# Patient Record
Sex: MALE | URBAN - METROPOLITAN AREA
[De-identification: names, ages, dates, MRNs, and addresses within clinical notes are randomized per-mention and may not be internally consistent; named-entity substitution may affect disease eponyms.]

---

## 2021-04-08 DIAGNOSIS — Z23 ENCOUNTER FOR IMMUNIZATION: ICD-10-CM

## 2024-02-20 ENCOUNTER — TELEPHONE (OUTPATIENT)
Dept: PSYCHIATRY | Facility: CLINIC | Age: 44
End: 2024-02-20

## 2024-02-20 NOTE — TELEPHONE ENCOUNTER
Patient LVM asking about an appointment tomorrow.    Writer LVM with office number for further assistance.

## 2024-08-17 ENCOUNTER — TELEPHONE (OUTPATIENT)
Dept: OTHER | Facility: OTHER | Age: 44
End: 2024-08-17

## 2024-08-19 PROBLEM — R68.82 DECREASED LIBIDO: Status: ACTIVE | Noted: 2024-08-19

## 2024-08-19 PROBLEM — M41.9 MULTILEVEL SCOLIOSIS: Status: ACTIVE | Noted: 2024-08-19

## 2024-08-19 PROBLEM — E55.9 VITAMIN D INSUFFICIENCY: Status: ACTIVE | Noted: 2024-08-19

## 2024-08-19 PROBLEM — F98.8 ATTENTION DEFICIT DISORDER: Status: ACTIVE | Noted: 2024-08-19

## 2024-08-19 PROBLEM — Z00.00 HEALTHCARE MAINTENANCE: Status: ACTIVE | Noted: 2024-08-19

## 2024-09-18 PROBLEM — Z00.00 HEALTHCARE MAINTENANCE: Status: RESOLVED | Noted: 2024-08-19 | Resolved: 2024-09-18

## 2024-10-05 LAB
ALBUMIN SERPL-MCNC: 4.3 G/DL (ref 3.5–5.7)
ALP SERPL-CCNC: 47 U/L (ref 35–120)
ALT SERPL-CCNC: 25 U/L
ANION GAP SERPL CALCULATED.3IONS-SCNC: 5 MMOL/L (ref 3–11)
AST SERPL-CCNC: 18 U/L
BASOPHILS # BLD AUTO: 0.1 THOU/CMM (ref 0–0.1)
BASOPHILS NFR BLD AUTO: 1 %
BILIRUB SERPL-MCNC: 0.4 MG/DL (ref 0.2–1)
BUN SERPL-MCNC: 12 MG/DL (ref 7–28)
CALCIUM SERPL-MCNC: 9.6 MG/DL (ref 8.5–10.1)
CHLORIDE SERPL-SCNC: 104 MMOL/L (ref 100–109)
CHOLEST SERPL-MCNC: 188 MG/DL
CHOLEST/HDLC SERPL: 2.5 {RATIO}
CO2 SERPL-SCNC: 32 MMOL/L (ref 21–31)
CREAT SERPL-MCNC: 0.97 MG/DL (ref 0.53–1.3)
CYTOLOGY CMNT CVX/VAG CYTO-IMP: ABNORMAL
DIFFERENTIAL METHOD BLD: NORMAL
EOSINOPHIL # BLD AUTO: 0.1 THOU/CMM (ref 0–0.5)
EOSINOPHIL NFR BLD AUTO: 2 %
ERYTHROCYTE [DISTWIDTH] IN BLOOD BY AUTOMATED COUNT: 13 % (ref 12–16)
GFR/BSA.PRED SERPLBLD CYS-BASED-ARV: 99 ML/MIN/{1.73_M2}
GLUCOSE SERPL-MCNC: 91 MG/DL (ref 65–99)
HCT VFR BLD AUTO: 39.6 % (ref 37–48)
HDLC SERPL-MCNC: 74 MG/DL (ref 23–92)
HGB BLD-MCNC: 13.5 G/DL (ref 12.5–17)
LDLC SERPL CALC-MCNC: 101 MG/DL
LYMPHOCYTES # BLD AUTO: 2.9 THOU/CMM (ref 1–3)
LYMPHOCYTES NFR BLD AUTO: 53 %
MCH RBC QN AUTO: 30.1 PG (ref 27–36)
MCHC RBC AUTO-ENTMCNC: 34.1 G/DL (ref 32–37)
MCV RBC AUTO: 88 FL (ref 80–100)
MONOCYTES # BLD AUTO: 0.4 THOU/CMM (ref 0.3–1)
MONOCYTES NFR BLD AUTO: 7 %
NEUTROPHILS # BLD AUTO: 2 THOU/CMM (ref 1.8–7.8)
NEUTROPHILS NFR BLD AUTO: 37 %
NONHDLC SERPL-MCNC: 114 MG/DL
PLATELET # BLD AUTO: 226 THOU/CMM (ref 140–350)
PMV BLD REES-ECKER: 8.5 FL (ref 7.5–11.3)
POTASSIUM SERPL-SCNC: 4.5 MMOL/L (ref 3.5–5.2)
PROT SERPL-MCNC: 6.8 G/DL (ref 6.3–8.3)
RBC # BLD AUTO: 4.49 MILL/CMM (ref 4–5.4)
SODIUM SERPL-SCNC: 141 MMOL/L (ref 135–145)
TRIGL SERPL-MCNC: 64 MG/DL
TSH SERPL-ACNC: 2.45 UIU/ML (ref 0.45–5.33)
WBC # BLD AUTO: 5.4 THOU/CMM (ref 4–10.5)

## 2024-12-19 LAB — 25(OH)D3+25(OH)D2 SERPL-MCNC: 29 NG/ML (ref 30–100)

## 2024-12-24 LAB
TESTOST FREE SERPL-MCNC: 4.14 PG/ML
TESTOST SERPL-MCNC: 252 NG/DL

## 2024-12-28 ENCOUNTER — RESULTS FOLLOW-UP (OUTPATIENT)
Dept: FAMILY MEDICINE CLINIC | Facility: CLINIC | Age: 44
End: 2024-12-28

## 2024-12-28 DIAGNOSIS — R79.89 LOW TESTOSTERONE LEVEL IN MALE: Primary | ICD-10-CM

## 2025-03-18 ENCOUNTER — TELEPHONE (OUTPATIENT)
Age: 45
End: 2025-03-18

## 2025-03-18 NOTE — TELEPHONE ENCOUNTER
New Patient      Insurance   Current Insurance? [employer]    Insurance E-verified? Showing connectivity error    History   Reason for appointment/active symptoms?  Referral for low testosterone     Has the patient had any previous Urologist(s)? no     Was the patient seen in the ED? no     Labs/Imaging(Including Out Of Network)? Labs 12/2024     Records Requested? yes  Records Visible in EPIC? yes      Personal history of cancer? no     Appointment   Office location preference:  [place]  ?   Appointment Details   Date:  4/4/25  Time:  2pm  Location:  [place]  Provider:  Yoli Daugherty  Does the appointment need further review?

## 2025-04-03 RX ORDER — DIPHENOXYLATE HYDROCHLORIDE AND ATROPINE SULFATE 2.5; .025 MG/1; MG/1
1 TABLET ORAL DAILY
COMMUNITY

## 2025-04-03 RX ORDER — BUPROPION HYDROCHLORIDE 300 MG/1
300 TABLET ORAL
COMMUNITY
Start: 2025-03-16

## 2025-04-04 ENCOUNTER — CONSULT (OUTPATIENT)
Dept: UROLOGY | Facility: AMBULATORY SURGERY CENTER | Age: 45
End: 2025-04-04

## 2025-04-04 VITALS
BODY MASS INDEX: 23.95 KG/M2 | HEART RATE: 95 BPM | DIASTOLIC BLOOD PRESSURE: 76 MMHG | WEIGHT: 158 LBS | OXYGEN SATURATION: 97 % | HEIGHT: 68 IN | SYSTOLIC BLOOD PRESSURE: 118 MMHG

## 2025-04-04 DIAGNOSIS — R79.89 LOW TESTOSTERONE LEVEL IN MALE: ICD-10-CM

## 2025-04-04 NOTE — PROGRESS NOTES
Name: Zeus Oliva IV      : 1980      MRN: [id number]  Encounter Provider: RAMY Perez  Encounter Date: 2025   Encounter department: [facility] [place]  :  Assessment & Plan  Low testosterone level in male  Last level completed 24 and returned with total testosterone of 252. Discussed treatment for low testosterone should be initiated only after two low readings 2 weeks apart tested in the morning.   Pt has been on methadone for many years which we discussed can also decrease testosterone.   He would like to complete further testing to assess for low reading and undergo treatment.   We did discuss the options for TRT including modalities of treatment such as gel, injections, or testopel performed in the office by Dr. Madera and side effects.   Will repeat labs including testosterone, CBC, FSH and LH. Pt to also complete TSH and Lipids recently ordered by his PCP.   We will have a follow up to discuss labs after he completes them.      Orders:    Ambulatory Referral to Urology    Testosterone, free, total; Future    CBC and differential; Future    Prolactin; Future    FSH and LH; Future        History of Present Illness   Zeus Oliva IV is a 44 y.o. male who presents as a new patient for low testosterone. Pt states over the last couple of months he has noticed a lack of motivation, fatigue, as well as a decreased libido. He states that he recently was seen my his psychiatrist who started him on wellbutrin and that has been helping him slightly with these symptoms. He notes that his  is currently on TRT for low T and he had similar symptoms when he ws diagnosed with low testosterone. He was seen by his PCP who ordered initial level of testosterone and it returned at 252 2024.    Review of Systems   Constitutional:  Positive for activity change and fatigue. Negative for chills and fever.   Gastrointestinal:  Negative for abdominal pain.   Genitourinary:  " Negative for difficulty urinating, dysuria, flank pain, frequency, hematuria and urgency.   Psychiatric/Behavioral:  Positive for dysphoric mood.           Objective   /76 (BP Location: Left arm, Patient Position: Sitting, Cuff Size: Standard)   Pulse 95   Ht 5' 8\" (1.727 m)   Wt 71.7 kg (158 lb)   SpO2 97%   BMI 24.02 kg/m²     Physical Exam  Vitals reviewed.   Constitutional:       General: He is not in acute distress.     Appearance: Normal appearance. He is normal weight. He is not toxic-appearing.   HENT:      Head: Normocephalic and atraumatic.   Eyes:      Conjunctiva/sclera: Conjunctivae normal.   Cardiovascular:      Rate and Rhythm: Normal rate.   Pulmonary:      Effort: Pulmonary effort is normal.   Abdominal:      Palpations: Abdomen is soft.   Musculoskeletal:         General: Normal range of motion.   Skin:     General: Skin is warm and dry.   Neurological:      Mental Status: He is alert and oriented to person, place, and time.   Psychiatric:         Mood and Affect: Mood normal.         Behavior: Behavior normal.           Results   No results found for: \"PSA\"  Lab Results   Component Value Date    CALCIUM 9.6 10/02/2024    K 4.5 10/02/2024    CO2 32 (H) 10/02/2024     10/02/2024    BUN 12 10/02/2024    CREATININE 0.97 10/02/2024     Lab Results   Component Value Date    WBC 5.4 10/02/2024    HGB 13.5 10/02/2024    HCT 39.6 10/02/2024    MCV 88 10/02/2024     10/02/2024       Office Urine Dip  No results found for this or any previous visit (from the past hour).      "

## 2025-04-21 ENCOUNTER — PATIENT MESSAGE (OUTPATIENT)
Dept: UROLOGY | Facility: AMBULATORY SURGERY CENTER | Age: 45
End: 2025-04-21

## 2025-04-22 NOTE — PATIENT COMMUNICATION
Please assist and get labs from [facility] for provider to review.     Please sent provider results , once received.

## 2025-04-24 ENCOUNTER — TELEPHONE (OUTPATIENT)
Dept: UROLOGY | Facility: AMBULATORY SURGERY CENTER | Age: 45
End: 2025-04-24

## 2025-04-24 NOTE — TELEPHONE ENCOUNTER
Called and spoke to pt and gave fax number that he should give to [facility] to have them get us the results.

## 2025-04-29 ENCOUNTER — OFFICE VISIT (OUTPATIENT)
Dept: FAMILY MEDICINE CLINIC | Facility: CLINIC | Age: 45
End: 2025-04-29
Payer: COMMERCIAL

## 2025-04-29 VITALS
RESPIRATION RATE: 16 BRPM | WEIGHT: 160.2 LBS | TEMPERATURE: 97.5 F | OXYGEN SATURATION: 98 % | DIASTOLIC BLOOD PRESSURE: 70 MMHG | SYSTOLIC BLOOD PRESSURE: 118 MMHG | HEIGHT: 68 IN | BODY MASS INDEX: 24.28 KG/M2 | HEART RATE: 94 BPM

## 2025-04-29 DIAGNOSIS — N52.8 OTHER MALE ERECTILE DYSFUNCTION: Primary | ICD-10-CM

## 2025-04-29 PROCEDURE — 99213 OFFICE O/P EST LOW 20 MIN: CPT | Performed by: FAMILY MEDICINE

## 2025-04-29 RX ORDER — SILDENAFIL 100 MG/1
100 TABLET, FILM COATED ORAL DAILY PRN
Qty: 30 TABLET | Refills: 0 | Status: SHIPPED | OUTPATIENT
Start: 2025-04-29

## 2025-04-29 NOTE — PROGRESS NOTES
"Name: Zeus Oliva IV      : 1980      MRN: [id number]  Encounter Provider: Mor Recinos MD  Encounter Date: 2025   Encounter department: [facility]  :  Assessment & Plan  Other male erectile dysfunction  - Advised pt to keep f/u with urology for treatment of low testosterone.   - Prescribed Viagra 100 mg tab PO prn   - Discussed most effective manner of taking Viagra, including 1-2 hours before sexual intercourse and on an empty stomach.  - Plan 6 month wellness visit with updated labs.    Orders:  •  sildenafil (Viagra) 100 mg tablet; Take 1 tablet (100 mg total) by mouth daily as needed for erectile dysfunction           History of Present Illness   Zeus Oliva IV is a 45 y/o male with a PMH of decreased libido and multilevel sclerosis presenting for erectile dysfunction x 1 month. Pt has tried a sample of tadalafil (Cialis) in the past. He states that he takes methadone (Methadose), Wellbutrin XR. Blood pressure in the office today was 118/70. Recent labs from urology reveal decreased testosterone. Pt plans to f/u with urology for treatment of low T. No additional complaints.       Erectile Dysfunction  Irritative symptoms do not include frequency or urgency. Pertinent negatives include no chills, dysuria or hematuria.   Review of Systems   Constitutional:  Negative for chills and fever.   Respiratory:  Negative for shortness of breath.    Cardiovascular:  Negative for chest pain and palpitations.   Genitourinary:  Negative for dysuria, frequency, hematuria, penile pain and urgency.       Objective   /70 (BP Location: Left arm, Patient Position: Sitting, Cuff Size: Standard)   Pulse 94   Temp 97.5 °F (36.4 °C) (Tympanic)   Resp 16   Ht 5' 8\" (1.727 m)   Wt 72.7 kg (160 lb 3.2 oz)   SpO2 98%   BMI 24.36 kg/m²      Physical Exam  Vitals reviewed.   Constitutional:       General: He is not in acute distress.     Appearance: He is well-developed and " normal weight.   HENT:      Head: Normocephalic and atraumatic.   Eyes:      Extraocular Movements: Extraocular movements intact.      Conjunctiva/sclera: Conjunctivae normal.   Cardiovascular:      Rate and Rhythm: Normal rate and regular rhythm.      Heart sounds: No murmur heard.  Pulmonary:      Effort: Pulmonary effort is normal. No respiratory distress.      Breath sounds: Normal breath sounds.   Musculoskeletal:         General: No swelling.      Cervical back: Neck supple.   Skin:     General: Skin is warm and dry.   Neurological:      Mental Status: He is alert.   Psychiatric:         Mood and Affect: Mood normal.

## 2025-04-29 NOTE — ASSESSMENT & PLAN NOTE
- Advised pt to keep f/u with urology for treatment of low testosterone.   - Prescribed Viagra 100 mg tab PO prn   - Discussed most effective manner of taking Viagra, including 1-2 hours before sexual intercourse and on an empty stomach.  - Plan 6 month wellness visit with updated labs.    Orders:  •  sildenafil (Viagra) 100 mg tablet; Take 1 tablet (100 mg total) by mouth daily as needed for erectile dysfunction

## 2025-05-29 ENCOUNTER — PATIENT MESSAGE (OUTPATIENT)
Dept: UROLOGY | Facility: AMBULATORY SURGERY CENTER | Age: 45
End: 2025-05-29

## 2025-06-13 ENCOUNTER — TELEPHONE (OUTPATIENT)
Dept: NEUROLOGY | Facility: CLINIC | Age: 45
End: 2025-06-13

## 2025-06-13 NOTE — TELEPHONE ENCOUNTER
LMOM asking patient to callback to reschedule his appt on 7/24/25 appt at 12:30pm at the [place] [facility] with Dr Mott due to scheduling issue (scheduled on blocked time) to 7/23/25 at 12:30pm. If patient accepting please reschedule, if that day does not work for him please reschedule him at next available appt slot.

## 2025-07-16 ENCOUNTER — TELEPHONE (OUTPATIENT)
Dept: NEUROLOGY | Facility: CLINIC | Age: 45
End: 2025-07-16

## 2025-07-16 NOTE — TELEPHONE ENCOUNTER
Mailed Appt reminder card to home address on file for upcoming Neurology appt with Dr Mott at the [place] office as well as directions to the office. I also asked he please bring any imaging disk(s) and records if he has any available.

## 2025-07-17 ENCOUNTER — TELEPHONE (OUTPATIENT)
Dept: UROLOGY | Facility: AMBULATORY SURGERY CENTER | Age: 45
End: 2025-07-17

## 2025-07-17 ENCOUNTER — PATIENT MESSAGE (OUTPATIENT)
Dept: UROLOGY | Facility: AMBULATORY SURGERY CENTER | Age: 45
End: 2025-07-17

## 2025-07-17 NOTE — TELEPHONE ENCOUNTER
"I called [facility] labs ([phone], spoke to \"Katlyn\") regarding labs that were ordered on this patient by RAMY Kent on 4/4/25, but were \"missing\" the FSH, LH and PROLACTIN results. Katlyn informed me that the patient brought a paper copy of labs with him that only included the page with the CBC with diff and Testerone.   "

## 2025-07-18 LAB
FSH SERPL-ACNC: 1.64 MIU/ML (ref 1.27–19.26)
LH SERPL-ACNC: 2.51 MIU/ML (ref 1.24–8.62)
PROLACTIN SERPL-MCNC: 11.15 NG/ML (ref 2.64–13.13)

## 2025-07-18 NOTE — TELEPHONE ENCOUNTER
Patient called stating his appointment needed to be rescheduled due to not having all the labs done.  He was not aware of other labs . He thought he did them all.  Informed patient he was missing   FSH, LH and PROLACTIN . He stated he will do them today.  Next available appointment with Yoli is September.  He stated he is not waiting 2 more months for the appointment.  Patient is scheduled with Jb for 07/29/25 at 240 pm.

## 2025-07-21 NOTE — TELEPHONE ENCOUNTER
Sent shalonda msg with directions and appt reminder.     LMOM confirming and reminding him of his appt on 7/28/25  at 8:00am with Dr Mott. I asked him to come 15mins prior to appt to allow us time to update his chart as needed. I also asked if he is unable to make the appt and needs to reschedule or cancel to please call the office.

## 2025-07-28 ENCOUNTER — CONSULT (OUTPATIENT)
Dept: NEUROLOGY | Facility: CLINIC | Age: 45
End: 2025-07-28
Attending: FAMILY MEDICINE
Payer: COMMERCIAL

## 2025-07-28 VITALS
WEIGHT: 164.2 LBS | BODY MASS INDEX: 24.89 KG/M2 | HEIGHT: 68 IN | TEMPERATURE: 97.1 F | DIASTOLIC BLOOD PRESSURE: 80 MMHG | RESPIRATION RATE: 16 BRPM | SYSTOLIC BLOOD PRESSURE: 124 MMHG | HEART RATE: 123 BPM | OXYGEN SATURATION: 97 %

## 2025-07-28 DIAGNOSIS — G35 MULTIPLE SCLEROSIS (HCC): Primary | ICD-10-CM

## 2025-07-28 DIAGNOSIS — R68.89 FORGETFULNESS: ICD-10-CM

## 2025-07-28 PROCEDURE — 99205 OFFICE O/P NEW HI 60 MIN: CPT | Performed by: PSYCHIATRY & NEUROLOGY

## 2025-07-29 ENCOUNTER — TELEPHONE (OUTPATIENT)
Age: 45
End: 2025-07-29

## 2025-07-29 ENCOUNTER — OFFICE VISIT (OUTPATIENT)
Dept: UROLOGY | Facility: AMBULATORY SURGERY CENTER | Age: 45
End: 2025-07-29
Payer: COMMERCIAL

## 2025-07-29 VITALS
BODY MASS INDEX: 25.4 KG/M2 | HEART RATE: 88 BPM | OXYGEN SATURATION: 98 % | DIASTOLIC BLOOD PRESSURE: 72 MMHG | WEIGHT: 167.6 LBS | SYSTOLIC BLOOD PRESSURE: 122 MMHG | HEIGHT: 68 IN

## 2025-07-29 DIAGNOSIS — R79.89 LOW TESTOSTERONE LEVEL IN MALE: Primary | ICD-10-CM

## 2025-07-29 DIAGNOSIS — R79.89 LOW TESTOSTERONE LEVEL IN MALE: ICD-10-CM

## 2025-07-29 PROCEDURE — 99213 OFFICE O/P EST LOW 20 MIN: CPT

## 2025-07-29 RX ORDER — TESTOSTERONE CYPIONATE 200 MG/ML
100 INJECTION, SOLUTION INTRAMUSCULAR
Qty: 5 ML | Refills: 3 | Status: SHIPPED | OUTPATIENT
Start: 2025-07-29

## 2025-07-29 RX ORDER — METHADONE HYDROCHLORIDE 10 MG/ML
15 CONCENTRATE ORAL EVERY 6 HOURS PRN
COMMUNITY

## 2025-07-31 RX ORDER — SYRINGE WITH NEEDLE, 1 ML 25GX5/8"
SYRINGE, EMPTY DISPOSABLE MISCELLANEOUS
Qty: 100 EACH | Refills: 1 | Status: SHIPPED | OUTPATIENT
Start: 2025-07-31

## 2025-08-01 NOTE — TELEPHONE ENCOUNTER
Called and LVM for pt asking to call back and let us know if was or wasn't able to have the medication for todays appt. CB# given    Will need to reschedule if does not has the medication for appt today.

## 2025-08-08 ENCOUNTER — PROCEDURE VISIT (OUTPATIENT)
Dept: UROLOGY | Facility: AMBULATORY SURGERY CENTER | Age: 45
End: 2025-08-08
Payer: COMMERCIAL

## 2025-08-08 VITALS
SYSTOLIC BLOOD PRESSURE: 116 MMHG | WEIGHT: 170 LBS | DIASTOLIC BLOOD PRESSURE: 62 MMHG | HEART RATE: 115 BPM | BODY MASS INDEX: 25.76 KG/M2 | HEIGHT: 68 IN

## 2025-08-08 DIAGNOSIS — R79.89 LOW TESTOSTERONE LEVEL IN MALE: Primary | ICD-10-CM

## 2025-08-08 PROCEDURE — 99211 OFF/OP EST MAY X REQ PHY/QHP: CPT
